# Patient Record
Sex: FEMALE | Race: WHITE | NOT HISPANIC OR LATINO
[De-identification: names, ages, dates, MRNs, and addresses within clinical notes are randomized per-mention and may not be internally consistent; named-entity substitution may affect disease eponyms.]

---

## 2019-07-19 ENCOUNTER — TRANSCRIPTION ENCOUNTER (OUTPATIENT)
Age: 64
End: 2019-07-19

## 2019-12-04 ENCOUNTER — TRANSCRIPTION ENCOUNTER (OUTPATIENT)
Age: 64
End: 2019-12-04

## 2020-01-06 ENCOUNTER — TRANSCRIPTION ENCOUNTER (OUTPATIENT)
Age: 65
End: 2020-01-06

## 2021-04-13 ENCOUNTER — TRANSCRIPTION ENCOUNTER (OUTPATIENT)
Age: 66
End: 2021-04-13

## 2022-01-24 ENCOUNTER — TRANSCRIPTION ENCOUNTER (OUTPATIENT)
Age: 67
End: 2022-01-24

## 2023-10-23 ENCOUNTER — NON-APPOINTMENT (OUTPATIENT)
Age: 68
End: 2023-10-23

## 2024-02-02 ENCOUNTER — APPOINTMENT (OUTPATIENT)
Dept: ORTHOPEDIC SURGERY | Facility: CLINIC | Age: 69
End: 2024-02-02
Payer: MEDICARE

## 2024-02-02 DIAGNOSIS — Z00.00 ENCOUNTER FOR GENERAL ADULT MEDICAL EXAMINATION W/OUT ABNORMAL FINDINGS: ICD-10-CM

## 2024-02-02 DIAGNOSIS — S93.602A UNSPECIFIED SPRAIN OF LEFT FOOT, INITIAL ENCOUNTER: ICD-10-CM

## 2024-02-02 PROCEDURE — 73630 X-RAY EXAM OF FOOT: CPT | Mod: LT

## 2024-02-02 PROCEDURE — 99204 OFFICE O/P NEW MOD 45 MIN: CPT

## 2024-02-02 PROCEDURE — 99214 OFFICE O/P EST MOD 30 MIN: CPT

## 2024-02-02 NOTE — DISCUSSION/SUMMARY
[de-identified] : Pt. has a mild lateral foot sprain. She can be WBAT in supportive footwear. Voltaren gel as directed. Ice to affected area. Activity modification. If no significant improvement over next two weeks, RTO.

## 2024-02-02 NOTE — PHYSICAL EXAM
[NL (40)] : plantar flexion 40 degrees [NL 30)] : inversion 30 degrees [NL (20)] : eversion 20 degrees [5___] : eversion 5[unfilled]/5 [2+] : posterior tibialis pulse: 2+ [Normal] : saphenous nerve sensation normal [] : patient ambulates without assistive device [Left] : left foot [Weight -] : weightbearing [de-identified] : Accessory navicular, small calcification 5th metatarsal base.

## 2024-02-02 NOTE — HISTORY OF PRESENT ILLNESS
[7] : 7 [2] : 2 [Sharp] : sharp [de-identified] : Pt is a 68 year old female who presents today for evaluation of her left foot. Pt states that pain started  in early 1//2024.  Pain localized along the lateral forefoot/midfoot.  Denies trauma/previous injury. No numbness/tingling. Tried massaging the area. WB in sneakers.  She is s/p ORIF of her right 5th metatarsal base fracture with calcaneal autograft from 10/5/21.  No issues with the right foot. [] : no [FreeTextEntry1] : Left Foot

## 2024-08-05 ENCOUNTER — APPOINTMENT (OUTPATIENT)
Dept: ORTHOPEDIC SURGERY | Facility: CLINIC | Age: 69
End: 2024-08-05

## 2024-08-05 PROBLEM — S96.912A STRAIN OF FOOT, LEFT, INITIAL ENCOUNTER: Status: ACTIVE | Noted: 2024-08-05

## 2024-08-05 PROCEDURE — 99214 OFFICE O/P EST MOD 30 MIN: CPT

## 2024-08-05 PROCEDURE — L4361: CPT | Mod: KX,LT

## 2024-08-05 PROCEDURE — 73630 X-RAY EXAM OF FOOT: CPT | Mod: LT

## 2024-08-05 NOTE — DISCUSSION/SUMMARY
[de-identified] : Patient's clinical exam does not match her xrays MRI left foot ordered to evaluate for a 3rd metatarsal fracture. She can be wb in a cam boot provided today. Icing/elevation prn.  Patient was instructed that they can not operate an automatic vehicle while wearing a CAM boot or cast on the right lower extremity. If operating a vehicle that requires use of a clutch, patient may not drive while wearing a CAM boot or cast on the left lower extremity.

## 2024-08-05 NOTE — PHYSICAL EXAM
[NL (40)] : plantar flexion 40 degrees [NL 30)] : inversion 30 degrees [NL (20)] : eversion 20 degrees [5___] : eversion 5[unfilled]/5 [2+] : posterior tibialis pulse: 2+ [Normal] : saphenous nerve sensation normal [Left] : left foot [Weight -] : weightbearing [Mild] : mild swelling of dorsal foot [3rd] : 3rd [] : no pain with abduction stress [de-identified] : Incomplete lucent line at proximal 3rd metatarsal shaft. Accessory navicular, small calcification 5th metatarsal base.  [FreeTextEntry8] : 3rd metatarsal tenderness is in distal shaft.

## 2024-08-05 NOTE — HISTORY OF PRESENT ILLNESS
[7] : 7 [2] : 2 [Sharp] : sharp [de-identified] : Pt is a 68 year old female who presents today for evaluation of her left foot.  She reports that pain started in early June, 2024.  She saw a podiatrist who said there was no fracture.  Padding and shoe modification were recommended. Pain persisted.  She had a follow up xray and was then told there was a fracture of the 3rd metatarsal.  She is wb in [facility].  She was previously seen in February, 2024 for left pain that had completely resolved prior to the recent symptoms.  She is s/p ORIF of her right 5th metatarsal base fracture with calcaneal autograft from 10/5/21.  No issues with the right foot.  [] : no [FreeTextEntry1] : Left Foot

## 2024-08-06 ENCOUNTER — APPOINTMENT (OUTPATIENT)
Dept: MRI IMAGING | Facility: CLINIC | Age: 69
End: 2024-08-06

## 2024-08-06 PROCEDURE — 73718 MRI LOWER EXTREMITY W/O DYE: CPT | Mod: LT,MH

## 2024-08-07 ENCOUNTER — TRANSCRIPTION ENCOUNTER (OUTPATIENT)
Age: 69
End: 2024-08-07

## 2024-08-12 ENCOUNTER — APPOINTMENT (OUTPATIENT)
Dept: ORTHOPEDIC SURGERY | Facility: CLINIC | Age: 69
End: 2024-08-12
Payer: MEDICARE

## 2024-08-12 DIAGNOSIS — S92.335A NONDISPLACED FRACTURE OF THIRD METATARSAL BONE, LEFT FOOT, INITIAL ENCOUNTER FOR CLOSED FRACTURE: ICD-10-CM

## 2024-08-12 PROCEDURE — 28470 CLTX METATARSAL FX WO MNP EA: CPT

## 2024-08-12 PROCEDURE — 99214 OFFICE O/P EST MOD 30 MIN: CPT | Mod: 57

## 2024-08-12 NOTE — PHYSICAL EXAM
[Mild] : mild swelling of dorsal foot [3rd] : 3rd [NL (40)] : plantar flexion 40 degrees [NL 30)] : inversion 30 degrees [NL (20)] : eversion 20 degrees [5___] : eversion 5[unfilled]/5 [2+] : posterior tibialis pulse: 2+ [Normal] : saphenous nerve sensation normal [Left] : left foot [Weight -] : weightbearing [] : no pain with heel compression [FreeTextEntry8] : ttp proximal third of 3rd metatarsal shaft.  [de-identified] : Incomplete lucent line at proximal 3rd metatarsal shaft. Accessory navicular, small calcification 5th metatarsal base.

## 2024-08-12 NOTE — DISCUSSION/SUMMARY
[de-identified] : MRI report and films reviewed with patient.  Recommend contiue weight bearing as tolerated in cam walker.  Ice to the affected area as needed.  Nsaids prn. Elevation encouraged.   Patient was instructed that they can not operate an automatic vehicle while wearing a CAM boot or cast on the right lower extremity. If operating a vehicle that requires use of a clutch, patient may not drive while wearing a CAM boot or cast on the left lower extremity.

## 2024-08-12 NOTE — HISTORY OF PRESENT ILLNESS
[7] : 7 [2] : 2 [Sharp] : sharp [de-identified] : Pt is a 68 year old female who presents today for follow up of her left foot.  She reports that pain started in early June, 2024.  She saw a podiatrist who said there was no fracture.  Padding and shoe modification were recommended. Pain persisted.  She had a follow up xray and was then told there was a fracture of the 3rd metatarsal. Since last visit, she has been WB in CAM boot and symptoms are improving. MRI consistent with 3rd metatarsal fracture.  [] : no [FreeTextEntry1] : Left Foot

## 2024-09-06 ENCOUNTER — APPOINTMENT (OUTPATIENT)
Dept: ORTHOPEDIC SURGERY | Facility: CLINIC | Age: 69
End: 2024-09-06
Payer: MEDICARE

## 2024-09-06 DIAGNOSIS — S92.335D NONDISPLACED FRACTURE OF THIRD METATARSAL BONE, LEFT FOOT, SUBSEQUENT ENCOUNTER FOR FRACTURE WITH ROUTINE HEALING: ICD-10-CM

## 2024-09-06 PROCEDURE — 99024 POSTOP FOLLOW-UP VISIT: CPT

## 2024-09-06 NOTE — DISCUSSION/SUMMARY
[de-identified] : Patient has recovered well, and can slowly resume activities as tolerated.  Home stretching exercises were encouraged to maintain flexibility. Ice/nsaids can be used as needed. Patient will follow up as needed.

## 2024-09-06 NOTE — HISTORY OF PRESENT ILLNESS
[7] : 7 [2] : 2 [Sharp] : sharp [de-identified] : Patient returns for her left MRI diagnosed 3rd metatarsal fracture. Symptoms from June, 2024.  She has been wb in her cam boot and reports feeling much better.  [] : no [FreeTextEntry1] : Left Foot

## 2024-09-06 NOTE — PHYSICAL EXAM
[NL (40)] : plantar flexion 40 degrees [NL 30)] : inversion 30 degrees [NL (20)] : eversion 20 degrees [5___] : eversion 5[unfilled]/5 [2+] : posterior tibialis pulse: 2+ [Normal] : saphenous nerve sensation normal [Left] : left foot [Weight -] : weightbearing [] : no pain with heel compression [de-identified] : WBAT in CAM boot.

## 2025-01-21 ENCOUNTER — NON-APPOINTMENT (OUTPATIENT)
Age: 70
End: 2025-01-21